# Patient Record
Sex: FEMALE | Race: WHITE
[De-identification: names, ages, dates, MRNs, and addresses within clinical notes are randomized per-mention and may not be internally consistent; named-entity substitution may affect disease eponyms.]

---

## 2021-06-14 ENCOUNTER — HOSPITAL ENCOUNTER (OUTPATIENT)
Dept: HOSPITAL 41 - JD.SDS | Age: 61
Discharge: HOME | End: 2021-06-14
Attending: ORTHOPAEDIC SURGERY
Payer: COMMERCIAL

## 2021-06-14 DIAGNOSIS — G47.33: ICD-10-CM

## 2021-06-14 DIAGNOSIS — Z88.8: ICD-10-CM

## 2021-06-14 DIAGNOSIS — E03.9: ICD-10-CM

## 2021-06-14 DIAGNOSIS — M17.0: Primary | ICD-10-CM

## 2021-06-14 DIAGNOSIS — Z79.899: ICD-10-CM

## 2021-06-14 DIAGNOSIS — Z90.49: ICD-10-CM

## 2021-06-14 DIAGNOSIS — I10: ICD-10-CM

## 2021-06-14 DIAGNOSIS — Z87.891: ICD-10-CM

## 2021-06-14 DIAGNOSIS — E11.9: ICD-10-CM

## 2021-06-14 PROCEDURE — 97161 PT EVAL LOW COMPLEX 20 MIN: CPT

## 2021-06-14 PROCEDURE — 97110 THERAPEUTIC EXERCISES: CPT

## 2021-06-14 PROCEDURE — C1713 ANCHOR/SCREW BN/BN,TIS/BN: HCPCS

## 2021-06-14 PROCEDURE — 27447 TOTAL KNEE ARTHROPLASTY: CPT

## 2021-06-14 PROCEDURE — 97535 SELF CARE MNGMENT TRAINING: CPT

## 2021-06-14 PROCEDURE — 20610 DRAIN/INJ JOINT/BURSA W/O US: CPT

## 2021-06-14 PROCEDURE — 97116 GAIT TRAINING THERAPY: CPT

## 2021-06-14 PROCEDURE — 73560 X-RAY EXAM OF KNEE 1 OR 2: CPT

## 2021-06-14 PROCEDURE — C1776 JOINT DEVICE (IMPLANTABLE): HCPCS

## 2021-06-14 PROCEDURE — 97165 OT EVAL LOW COMPLEX 30 MIN: CPT

## 2021-06-14 RX ADMIN — TRIAMCINOLONE ACETONIDE ONE MG: 40 INJECTION, SUSPENSION INTRA-ARTICULAR; INTRAMUSCULAR at 10:29

## 2021-06-14 RX ADMIN — DEXTROSE ONE GM: 5 SOLUTION INTRAVENOUS at 11:19

## 2021-06-14 RX ADMIN — CEFUROXIME PRN MG: 750 INJECTION, POWDER, FOR SOLUTION INTRAMUSCULAR; INTRAVENOUS at 10:28

## 2021-06-14 RX ADMIN — TRIAMCINOLONE ACETONIDE ONE MG: 40 INJECTION, SUSPENSION INTRA-ARTICULAR; INTRAMUSCULAR at 11:40

## 2021-06-14 RX ADMIN — DEXTROSE ONE GM: 5 SOLUTION INTRAVENOUS at 10:27

## 2021-06-14 RX ADMIN — CEFUROXIME PRN MG: 750 INJECTION, POWDER, FOR SOLUTION INTRAMUSCULAR; INTRAVENOUS at 11:16

## 2021-06-14 NOTE — [id number]
Post Anesthesia Note





- EVALUATION WITHIN 48HRS OF ANESTHETIC


Vital Signs in Normal Range: Yes


Patient Participated in Evaluation: Yes


Respiratory Function Stable: Yes


Airway Patent: Yes


Cardiovascular Function Stable: Yes


Hydration Status Stable: Yes


Pain Control Satisfactory: Yes


Nausea and Vomiting Control Satisfactory: Yes


Mental Status Recovered: Yes


Vital Signs: 


                                Last Vital Signs











Temp  97.9 F   06/14/21 12:15


 


Pulse  65   06/14/21 12:15


 


Resp  12   06/14/21 12:15


 


BP  116/65   06/14/21 12:15


 


Pulse Ox  99   06/14/21 12:15

## 2021-06-14 NOTE — PCM.PREANE
Preanesthetic Assessment





- Procedure


Proposed Procedure: 





Right Total Knee Arthroplasty with Left Knee steroid Injection.





- Anesthesia/Transfusion/Family Hx


Anesthesia History: Prior Anesthesia Without Reaction


Family History of Anesthesia Reaction: No


Transfusion History: No Prior Transfusion(s)


Intubation History: Unknown





- Review of Systems


General: No Symptoms


Pulmonary: No Symptoms (History of LAUREN with failed CPAP: patient denies LAUREN/ 

Former smoker quit 1985, ETOH: rarely)


Cardiovascular: No Symptoms (HTN)


Gastrointestinal: No Symptoms (GERD-tums, Colitis, History of gastric bypass)


Neurological: No Symptoms


Other: Reports: Diabetes (History of prediabetes prior to weight loss.), Thyroid

Problems (History of hypothyroidism), Depression





- Physical Assessment


NPO Status Date: 06/13/21


NPO Status Time: 23:58


Vital Signs: 





HR: 66


B/P: 140/82


Sat: 95%


Resp: 18


Temp: 98


Height: 1.57 m


Weight: 100 kg


ASA Class: 2


Mental Status: Alert & Oriented x3


Airway Class: Mallampati = 2


Dentition: Reports: Normal Dentition, Caries


Thyro-Mental Finger Breadths: 3


Mouth Opening Finger Breadths: 3


ROM/Head Extension: Full


Lungs: Clear to Auscultation, Normal Respiratory Effort


Cardiovascular: Regular Rate, Regular Rhythm, No Murmurs





- Lab


Values: 





All labs reviewed and noted and within acceptable ranges to proceed with 

scheduled procedure.





- Imaging/EKG


Impressions: 





EKG: NSR rate=75, LAD, ? LVH





- Allergies


Allergies/Adverse Reactions: 


                                    Allergies











Allergy/AdvReac Type Severity Reaction Status Date / Time


 


lisinopril AdvReac  Cough Verified 06/11/21 11:23














- Anesthesia Plan


Pre-Op Medication Ordered: Other (Preoperative Meds: (lyrica, tylenol, 

oxycontin) all p.o. at 0816)





- Acknowledgements


Anesthesia Type Planned: Spinal (and a Right Adductor Canal Block under US 

guidance for post operative pain control requested by Dr. Edwards.)


Pt an Appropriate Candidate for the Planned Anesthesia: Yes


Alternatives and Risks of Anesthesia Discussed w Pt/Guardian: Yes


Pt/Guardian Understands and Agrees with Anesthesia Plan: Yes





PreAnesthesia Questionnaire


HEENT History: Reports: Impaired Vision, Other (See Below)


Other HEENT History: wears glasses, contact


Cardiovascular History: Reports: Hypertension


Respiratory History: Reports: None


Gastrointestinal History: Reports: GERD, Other (See Below)


Other Gastrointestinal History: ulcerative colitis


Genitourinary History: Reports: None


OB/GYN History: Reports: None


Musculoskeletal History: Reports: Arthritis


Neurological History: Reports: None


Psychiatric History: Reports: Depression


Endocrine/Metabolic History: Reports: Hypothyroidism


Hematologic History: Reports: None


Immunologic History: Reports: None


Oncologic (Cancer) History: Reports: None


Dermatologic History: Reports: None





- Infectious Disease History


Infectious Disease History: Reports: None





- Past Surgical History


Head Surgeries/Procedures: Reports: None


Cardiovascular Surgical History: Reports: None


Respiratory Surgical History: Reports: None


GI Surgical History: Reports: Appendectomy, Bariatric Procedure, Colonoscopy


Female  Surgical History: Reports: Hysterectomy


Male  Surgical History: Reports: None


Endocrine Surgical History: Reports: None


Neurological Surgical History: Reports: None


Musculoskeletal Surgical History: Reports: None


Oncologic Surgical History: Reports: None


Dermatological Surgical History: Reports: None





- SUBSTANCE USE


Tobacco Use Status *Q: Former Tobacco User


Recreational Drug Use History: No





- HOME MEDS


Home Medications: 


                                    Home Meds





Cholecalciferol (Vitamin D3) [Vitamin D3] 10,000 unit PO DAILY 06/11/21 

[History]


Diclofenac Sodium [Voltaren 1% Gel] 1 dose TOP BID PRN 06/11/21 [History]


Levothyroxine 25 mcg PO DAILY 06/11/21 [History]


Losartan/Hydrochlorothiazide [Losartan-HCTZ 50-12.5 MG] 1 tab PO DAILY 06/11/21 

[History]


Lysine 500 mg PO DAILY 06/11/21 [History]


Magnesium 200 mg PO DAILY 06/11/21 [History]


Multivitamin 1 tab PO DAILY 06/11/21 [History]


estradioL [Estradiol] 0.5 mg PO DAILY 06/11/21 [History]


hydroCHLOROthiazide [Hydrochlorothiazide] 12.5 mg PO DAILY 06/11/21 [History]


sulfaSALAzine [Azulfidine] 1,000 mg PO DAILY 06/11/21 [History]


Cyclobenzaprine [Flexeril] 10 mg PO BID PRN #20 tab 06/14/21 [Rx]


Rivaroxaban [Xarelto] 10 mg PO DAILY #30 tab 06/14/21 [Rx]


oxyCODONE 5 - 10 mg PO Q4H PRN #40 tab 06/14/21 [Rx]











- CURRENT (IN HOUSE) MEDS


Current Meds: 





                               Current Medications





Acetaminophen (Acetaminophen 325 Mg Tab)  975 mg PO ONETIME ASHLYN


   Stop: 06/14/21 16:00


Morphine Sulfate 8 mg/Epinephrine HCl 0.3 mg/Cefuroxime Sodium 750 mg/Ketorolac 

Tromethamine 30 mg/Sodium Chloride 7.9 ml  0 mg .XX ASDIRECTED PRN


   PRN Reason: Pain


   Stop: 06/14/21 16:00


Lactated Ringer's (Ringers, Lactated)  1,000 mls @ 125 mls/hr IV ASDIRECTED ASHLYN


   Stop: 06/14/21 23:00


Lidocaine/Sodium Bicarbonate (Lidocaine 1%/Sod Bicarbonate In Ns 8.4% 1 Ml 

Syringe)  0.25 ml IDERM ONETIME PRN


   PRN Reason: Prior to IV Start


   Stop: 06/14/21 18:00


Oxycodone HCl (Oxycodone Er 10 Mg Tab.Er)  10 mg PO ONETIME ASHLYN


   Stop: 06/14/21 16:00


Pregabalin (Pregabalin 25 Mg Cap)  50 mg PO ONETIME ASHLYN


   Stop: 06/14/21 16:00


Sodium Chloride (Sodium Chloride 0.9% 10 Ml Syringe)  10 ml FLUSH ASDIRECTED PRN


   PRN Reason: Keep Vein Open


   Stop: 06/14/21 18:00





Discontinued Medications





Cefazolin Sodium (Cefazolin 1 Gm Vial) Confirm Administered Dose 2 gm .ROUTE 

.STK-MED ONE


   Stop: 06/14/21 07:53


Epinephrine HCl (Epinephrine 1 Mg/Ml Sdv) Confirm Administered Dose 1 mg .ROUTE 

.STK-MED ONE


   Stop: 06/14/21 07:47


Midazolam HCl (Midazolam 1 Mg/Ml 2 Ml Sdv) Confirm Administered Dose 2 mg .ROUTE

 .STK-MED ONE


   Stop: 06/14/21 07:43


Propofol (Propofol 200 Mg/20 Ml Sdv) Confirm Administered Dose 400 mg .ROUTE 

.STK-MED ONE


   Stop: 06/14/21 07:43


Ropivacaine (Ropivacaine 0.5% 5 Mg/Ml 30 Ml Sdv) Confirm Administered Dose 30 ml

 .ROUTE .STK-MED ONE


   Stop: 06/14/21 07:47

## 2021-06-14 NOTE — PCM.POSTAN
POST ANESTHESIA ASSESSMENT





- MENTAL STATUS


Mental Status: Alert, Oriented





- VITAL SIGNS


Vital Signs: 


                                Last Vital Signs











Temp  208.2 F H  06/14/21 12:00


 


Pulse  66   06/14/21 12:00


 


Resp  18   06/14/21 12:00


 


BP  107/67   06/14/21 12:00


 


Pulse Ox  100   06/14/21 12:00








1143 in PACU 


BP: 115/56


HR 85


RR 15


Temp: 97.4


Sat 96 2 liters





- RESPIRATORY


Respiratory Status: Respiratory Rate WNL, Airway Patent, O2 Saturation Stable





- CARDIOVASCULAR


CV Status: Pulse Rate WNL, Blood Pressure Stable





- GASTROINTESTINAL


GI Status: No Symptoms





- PAIN


Pain Score: 7


Free Text/Narrative:: 





Adductor canal block completed following arrival to PACU





- POST OP HYDRATION


Hydration Status: Adequate & Stable

## 2021-06-14 NOTE — CR
Right knee: AP and crosstable lateral views of the right knee were 

obtained.

 

Comparison: Prior right knee CT study of 06/01/21.

 

Knee prosthesis is seen.  Components are aligned.  Patellar prosthesis

 is also noted.  Soft tissue air is noted.  Underlying bony structures

 show nothing acute.

 

Impression:

1.  Satisfactory postop radiographic appearance of recently placed 

right knee prostheses.

 

Diagnostic code #2

## 2021-06-14 NOTE — PCM.PRNOTE
- Free Text/Narrative


Note: 





Right selective femoral nerve block at the adductor canal for post-procedure 

pain control under US guidance requested by Dr. Edwards.


Date: 6/14/21


Time Out: 1150


Start: 1151


End: 1200





Chart reviewed.  Consent signed.  Questions answered. Appropriate monitors 

applied.  Time out performed. Large amount of adipose tissue noted, depth 

increased to allow for visualization of anatomy. Right mid-shaft femur 

identified with ultrasound, scanning medially of femur, the femoral artery in 

the adductor canal visualized, and the femoral nerve located laterally to the 

artery. The skin was prepped lateral to the ultrasound probe with chlorahexadine

times two.  The 21ga 4 insulated block needle was inserted under direct 

ultrasound guidance into the adductor canal.  25mL of 0.5% ropivacaine with 

1:200,000 epinephrine was injected circumferentially around the nerve with 

intermittent negative aspiration noted.  Patient tolerated the procedure well.  

Sterile technique noted along with sterile gloves, mask, and sterile probe 

cover.  See picture on progress note and vital signs on nurses notes.  Block 

completed in PACU.  





Renetta Arevalo, CRNA

## 2021-06-29 NOTE — OR
DATE OF OPERATION:  06/14/2021

 

SURGEON:  Mervin Edwards MD

 

OPERATION PERFORMED:  Right total knee arthroplasty with [employer] robot[employer]

and left knee corticosteroid injection.

 

PREOPERATIVE DIAGNOSIS:

Bilateral knee osteoarthrosis.

 

POSTOPERATIVE DIAGNOSIS:

Bilateral knee osteoarthrosis.

 

ANESTHESIA:

Local MAC with spinal.

 

ANESTHESIA PROVIDER:

Clinton Torres.

 

ASSISTANTS:

Mamie Robin PA-C and Florencia Washington LPN.

 

ESTIMATED BLOOD LOSS:

200 mL.

 

COMPLICATIONS:

None.

 

CONDITION:

Stable.

 

IMPLANT:

1. [employer] size 3 press-fit CR femur.

2. [employer] size 3 press-fit tibial baseplate.

3. [employer] size 3 13 mm CS polyethylene insert.

4. Rohith size 29 x 9 mm press-fit asymmetric patella.

 

DESCRIPTION OF PROCEDURE:

The patient was identified in the preoperative holding area where proper site

was marked and identified by the surgeon.  The patient was taken back to the

operative theater where after adequate anesthesia, the patient's right lower

extremity had a nonsterile tourniquet applied and then sterilely prepped and

draped in the usual sterile fashion.  OR time-out was performed.  The patient

received 2 g IV Ancef.  Right lower extremity had the leg hannah boot applied

and then was exsanguinated.  Tourniquet was insufflated to 250 mmHg.  Standard

anterior incision was made and medial parapatellar arthrotomy was created.  Deep

fibers of the MCL were raised and anterior fat pad was resected.  Attention was

turned to the patella.  Patella measured 21, resected to a 13 for 29 x 9 mm

patella.  Drill holes were then drilled and found to have adequate bone.  At

this time, two 4.0 Schanz pins were placed intra-incisionally on the femur and

the [employer] robotic array for the femur was placed.  Two more small focal

incisions were placed on the tibia 3 fingerbreadths below the tibial tubercle

and the Rohith Leon robotic array was placed.  Checkpoints were placed on the

femur and the tibia.  Hip center rotation was obtained.  Medial and lateral

malleoli were marked.  Both checkpoints were then marked.  40 points were then

obtained on both the femur and the tibia for this [employer] robotic plan.

The patient's knee then was brought to full extension.  The patient was noted to

have greater than 15 degrees of recurvatum.  I then did varus-valgus instability

at 0 degrees and at 90 degrees of flexion.  At this time, we did 20 mm flexion

and extension gaps with the patient.  At this time, the [employer] robotic saw

blade was brought in.  The tibial cut was completed as well as posterior femoral

cut, anterior femoral cut, and anterior chamfer cut.  Saw blade was then

switched and the distal femoral cut and posterior chamfer cuts were completed.

It was found to have adequate bony resection as well as adequate bone stock.  At

this time, bony fragments were removed as well as the medial and lateral

meniscus were resected and posterior osteophytes were removed.  The size 3 trial

baseplate was then placed.  Size 3 trial femur was placed and a 9 mm trial poly

was placed.  The patient was noted to have significant extensor lag still.  I

was then able to get up to a size 13 and the patient had just 4 to 5 degrees of

recurvatum which was significantly improved from previous.  At this time, it was

otherwise stable to varus-valgus stresses.  The femur drill holes were drilled.

The tibia was stamped and drilled in the proper rotation.  Trial implants were

then removed.  The tibia was impacted into place.  Size 3 femur was impacted in

place and the 13 mm polyethylene insert was impacted in place.  The patient's

knee was brought to full extension.  A 29 x 9 mm press-fit patella was then

press-fit in place.  At this time, tourniquet was deflated.  Bleeders were

cauterized.  The [employer] robotic checkpoints were removed.  1 L pulse

lavage irrigation with Ancef was irrigated through the knee along with 400 mL

Irrisept irrigation.  Periarticular injection was completed.  Topical tranexamic

acid and vancomycin powder were applied.  A #2 barbed suture was used for

closure of the medial parapatellar arthrotomy.  2-0 Vicryl was used

subcutaneously as well as Stratafix and Prineo was used for skin closure.  The

patient had a sterile soft dressing applied.  Attention was turned to the

contralateral knee and 2 mL of 40 mg

Kenalog and 4 mL of 0.25% Marcaine were injected in the left knee.  The patient

tolerated this well, was sent to PACU in stable condition.

 

DD:  06/29/2021 14:50:55

DT:  06/29/2021 15:12:11  MMODAL

Job #:  [id number]

## 2021-06-29 NOTE — PCM.OPNOTE
- General Post-Op/Procedure Note


Date of Surgery/Procedure: 06/14/21


Operative Procedure(s): right total knee arthroplasty with dima ju[employer] robotics

and left knee corticosteroid injection


Pre Op Diagnosis: bilateral knee osteoarthrosis


Post-Op Diagnosis: Same


Anesthesia Technique: Local, MAC, Spinal


Primary Surgeon: Mervin Edwards


Anesthesia Provider: Renetta Arevalo


Assistant: Mamie Robin


Assistant: Florencia Washington


EBL in mLs: 200


Complications: None


Condition: Good


Free Text/Narrative:: 


3/3


13mm


29x9

## 2021-09-20 ENCOUNTER — HOSPITAL ENCOUNTER (OUTPATIENT)
Dept: HOSPITAL 41 - JD.SDS | Age: 61
Discharge: HOME | End: 2021-09-20
Attending: ORTHOPAEDIC SURGERY
Payer: COMMERCIAL

## 2021-09-20 DIAGNOSIS — Z20.822: ICD-10-CM

## 2021-09-20 DIAGNOSIS — Z98.890: ICD-10-CM

## 2021-09-20 DIAGNOSIS — I10: ICD-10-CM

## 2021-09-20 DIAGNOSIS — Z88.8: ICD-10-CM

## 2021-09-20 DIAGNOSIS — E11.9: ICD-10-CM

## 2021-09-20 DIAGNOSIS — G47.33: ICD-10-CM

## 2021-09-20 DIAGNOSIS — E03.9: ICD-10-CM

## 2021-09-20 DIAGNOSIS — Z79.899: ICD-10-CM

## 2021-09-20 DIAGNOSIS — M17.0: Primary | ICD-10-CM

## 2021-09-20 PROCEDURE — 27447 TOTAL KNEE ARTHROPLASTY: CPT

## 2021-09-20 PROCEDURE — 73560 X-RAY EXAM OF KNEE 1 OR 2: CPT

## 2021-09-20 PROCEDURE — C1776 JOINT DEVICE (IMPLANTABLE): HCPCS

## 2021-09-20 PROCEDURE — C1713 ANCHOR/SCREW BN/BN,TIS/BN: HCPCS

## 2021-09-20 PROCEDURE — 97161 PT EVAL LOW COMPLEX 20 MIN: CPT

## 2021-09-20 PROCEDURE — 97110 THERAPEUTIC EXERCISES: CPT

## 2021-09-20 NOTE — CR
Left knee: AP and crosstable lateral views of the left knee were 

obtained.

 

Comparison: Prior CT left knee study of 09/08/21.

 

Knee prosthesis is seen.  Patellar prosthesis is also noted.  

Underlying bony structures show nothing else acute.  Mild amount of 

soft tissue air is seen.

 

Impression:

1.  Satisfactory postoperative radiographic appearance of recently 

placed left knee prostheses.

 

Diagnostic code #2

## 2021-09-20 NOTE — [id number]
Post Anesthesia Note





- EVALUATION WITHIN 48HRS OF ANESTHETIC


Vital Signs in Normal Range: Yes


Patient Participated in Evaluation: Yes


Respiratory Function Stable: Yes


Airway Patent: Yes


Cardiovascular Function Stable: Yes


Hydration Status Stable: Yes


Pain Control Satisfactory: Yes


Nausea and Vomiting Control Satisfactory: Yes


Mental Status Recovered: Yes


Vital Signs: 


                                Last Vital Signs











Temp  36.2 C   09/20/21 12:45


 


Pulse  76   09/20/21 12:45


 


Resp  14   09/20/21 12:45


 


BP  105/62   09/20/21 12:45


 


Pulse Ox  100   09/20/21 12:45

## 2021-09-20 NOTE — PCM.SN.2
- Free Text/Narrative


Note: 





Left selective femoral nerve block at the adductor canal for post-procedure pain

control under US guidance requested by Dr. Edwards.


Time Out: 1211


Start: 1213


End: 1218





Chart reviewed.  Consent signed.  Questions answered. Appropriate monitors 

applied.  


Time out performed. Left mid-shaft femur identified with ultrasound, scanning 

medially of femur, the femoral artery in the adductor canal visualized, and the 

femoral nerve located laterally to the artery.


 The skin was prepped lateral to the ultrasound probe with chlorahexadine times 

two.  The 21ga 4 insulated block needle was inserted under direct ultrasound 

guidance into the adductor canal.  20mL of 0.5% ropivacaine with 1:200,000 

epinephrine was injected circumferentially around the nerve with intermittent 

negative aspiration noted.  


Patient tolerated the procedure well.  Sterile technique noted along with 

sterile gloves, mask, and sterile probe cover.  See picture on progress note and

vital signs on nurses notes.  Block completed in PACU.  


Shabnam Maher CRNA





Time Documentation

## 2021-09-29 NOTE — PCM.OPNOTE
- General Post-Op/Procedure Note


Date of Surgery/Procedure: 09/20/21


Operative Procedure(s): left total knee arthroplasty with dima juan manuel robotics


Pre Op Diagnosis: left knee osteoarthrosis


Post-Op Diagnosis: Same


Anesthesia Technique: Local, MAC, Spinal


Primary Surgeon: Mervin Edwards


Anesthesia Provider: Katie Moran


Assistant: Mamie Robin


Assistant: Dinah Kay


EBCONOR in mLs: 100


Complications: None


Condition: Good


Free Text/Narrative:: 


3/3


9mm


32x10 press fit

## 2024-09-24 NOTE — OR
Chief Complaint(s):   Chief Complaint   Patient presents with    Office Visit    Neck    Back Pain     low     Date of Injury: 24  Initial Treatment Date: 24  Date Last Seen: 24  Visit Number of Current Episode: 4  Flare Count: 2  Mechanism of Onset: camping  Initial pain ratin  Occupation: Regional    Referred by: Marc Sharma DC  Primary Care Physician: Ophelia Sandoval DO  I have reviewed the past medical history, family history, social history, medications and allergies listed in the medical record as obtained by my nursing staff and support staff and agree with their documentation.  Lu  reports that she has never smoked. She has never been exposed to tobacco smoke. She has never used smokeless tobacco.  Lu has No Known Allergies.  Advance Directive Status: None   Date informed consent expires on: 10/23/25  Date dry needling consent signed: LARRY  Medicare Waiver signed: NA  Discharged from care: No    Treatment Synopsis:    Plan includes: Joint Manipulation and Soft Tissue Mobilization    SUBJECTIVE:    Lu Awad returns for continued treatment.     Symptom location: neck pain, bilateral, low back pain,  right, and jaw pain/TMJ, bilateral  Pain/Symptom since last visit: better  Function since last visit: better  Soreness post treatment: No  New symptoms (i.e. accident/injuries)?: No   Current Pain/Symptom Scale (0-10, 10 being the worst, 0 being no pain): 2  Pain/Symptom at its best since last visit 0-10: 0  Pain/Symptom at its worst since last visit 0-10: 3  Pain/Symptom Description: tight, sore  Increases pain/symptom: N/A, but notices it more in the morning/when she wakes up  Radiates: no   Numbness/Tingling: no  Bowel/Bladder Dysfunction: n/a  Sleep disturbance due to pain/symptom:  \"maybe just a little\" - from the neck  Exercises/stretches: yes  Headache: no - but had one yesterday and has been having them more  Patient reports feeling better since initial visit by  DATE OF OPERATION:  09/20/2021

 

SURGEON:  Mervin Edwards MD

 

OPERATION PERFORMED:  Left total knee arthroplasty using [place] [employer] robotics.

 

PREOPERATIVE DIAGNOSIS:

Left knee osteoarthrosis.

 

POSTOPERATIVE DIAGNOSIS:

Left knee osteoarthrosis.

 

ANESTHESIA:

Local MAC with spinal.

 

ANESTHESIA PROVIDER:

Katie Moran CRNA

 

ASSISTANTS:

Mamie Robin PA-C and Dinah Kay RN

 

ESTIMATED BLOOD LOSS:

100 mL.

 

COMPLICATIONS:

None.

 

CONDITION:

Stable.

 

IMPLANTS:

1. Rohith size 3 press-fit CR femur.

2. [place] size 3 press-fit tibial baseplate.

3. [place] size 3, 9 mm CS polyethylene insert.

4. Rohith size 32 x 10 mm press-fit asymmetric patella.

 

DESCRIPTION OF PROCEDURE:

The patient was identified in the preop holding area.  Proper site was marked

and identified by surgeon.  The patient was taken back to the operating theater,

where after adequate anesthesia, the patient's left lower extremity had a

nonsterile tourniquet applied.  It was then sterilely prepped and draped in the

usual sterile fashion.  OR time-out was performed.  The patient received 2 g IV

Ancef.  Leg hannah was applied in the left lower extremity.  Left lower

extremity was exsanguinated.  Tourniquet was insufflated to 250 mmHg.  Standard

anterior incision was made and medial parapatellar arthrotomy was created.  Deep

fibers of the MCL were raised and anterior fat pad was resected.  Attention was

turned to the patella.  Patella measured 24, resected to 14 for a 32 x 10 mm

patella.  Drill holes were then drilled.  Two 4.0 Schanz pins then were placed

intra-incisionally on the femur for the [place] [employer] robotic array and 2 more

were placed in the tibia extra incisional length 3 fingerbreadths below the

tibial tubercle.  Checkpoints were applied to the femur and the tibia.  Hip

center rotation was obtained.  Medial and lateral malleoli were marked.

Checkpoints were marked.  40 points were then obtained of both the femur and the

tibia for the [place] [employer] robotic plan.  The patient's knee was brought to full

extension as well as 90 degrees of flexion, and varus and valgus stresses were

applied.  R[employer] Leon robotic plan for this patient was then undertaken.  A

straight saw blade was brought in.  Tibia cut was completed.  The anterior

femoral cut, anterior chamfer cut, and posterior femoral cuts were completed.

Saw blade was switched.  Distal femoral cut and posterior chamfer cut were then

completed.  All bony fragments were removed.  Medial and lateral menisci were

resected as well as any posterior osteophytes.  At this time, trial implants

size 3 and size 3 were placed.  9 mm trial was placed.  The patient had full

extension and flexion.  No signs of liftoff and no varus-valgus instability.

Femoral drill holes were drilled.  Tibia was stamped and drilled in proper

rotation.  Size 3 press-fit base plate was then impacted in place.  Size 3 femur

was impacted in place.  A 9 mm CS polyethylene insert was then impacted in

place.  32 x 10 mm press-fit patella was pressed into place.  Tourniquet was

deflated.  Bleeders were cauterized.  1 L pulse lavage irrigation with Ancef was

irrigated through the knee along with 400 mL Irrisept irrigation.  Topical

tranexamic acid and vancomycin powder were applied.  Periarticular injection was

completed.  #2 barbed suture was used for closure of the medial parapatellar

arthrotomy.  Checkpoints and arrays and pins were removed before this.  2-0

Vicryl and Stratafix were used for subcutaneous closure, and Prineo was used for

skin closure.  The patient tolerated the procedure well, sent to PACU in stable

condition.

 

DD:  10/04/2021 07:27:45

DT:  10/04/2021 07:59:30  MAYE

Job #:  [id number] 50%     Additional information: NA    VITALS    Visit Vitals  /74 (BP Location: RUE - Right upper extremity, Patient Position: Sitting, Cuff Size: Regular)   Pulse 96   Temp 97.1 °F (36.2 °C)   LMP 04/21/2024 (Approximate)   SpO2 97%     ALLERGIES    ALLERGIES:  No Known Allergies    CURRENT MEDICATIONS    Current Outpatient Medications   Medication Sig Dispense Refill    Cholecalciferol (VITAMIN D3) 5000 units capsule Take 1 capsule by mouth daily. Do not start before October 7, 2019. 90 capsule 3     No current facility-administered medications for this visit.     PAST MEDICAL HISTORY    Past Medical History:   Diagnosis Date    AMA (advanced maternal age) multigravida 35+ (CMD)     Ankle sprain 2017    left    Hallux abductovalgus with bunions      SURGICAL HISTORY    Past Surgical History:   Procedure Laterality Date    Episiotomy/vaginal repr by othr than attending      x3    Vagina surgery      Vaginal Tags Removed; after 1st delivery    [place] tooth extraction       SOCIAL HISTORY    Social History     Tobacco Use    Smoking status: Never     Passive exposure: Never    Smokeless tobacco: Never   Vaping Use    Vaping status: never used   Substance Use Topics    Alcohol use: No    Drug use: No     FAMILY HISTORY    Family History   Problem Relation Age of Onset    Depression Mother     Heart Mother         Stents    Stroke/TIA Mother         small vessel disease    Blood Disorder Mother         Factor 5 Leiden    Hypertension Mother     Cancer, Prostate Father     Heart disease Father     Diabetes Brother         Sibiling    Patient is unaware of any medical problems Son     Patient is unaware of any medical problems Son     Heart disease Maternal Grandmother     Diabetes Maternal Grandmother     Diabetes Paternal Grandmother     Hypertension Paternal Grandmother     Cancer, Colon Maternal Grandfather     Depression Paternal Grandfather     Diabetes Paternal Grandfather        Patient Emotional screening  was completed 8/7/24; [employer] Pain Supplemental Questionnaire score was na out of 40.    OBJECTIVE:      GENERAL:  Denies fever, chills, tiredness, malaise, unintentional weight changes.  HEENT: Denies ear pain, eye drainage, rhinorrhea, sore throat, speech issues or swallowing problems.  CARDIOVASCULAR:  Denies chest pain, shortness of breath.  RESPIRATORY: Denies wheezing, frequent cough, shortness of breath.  GASTROINTESTINAL: Denies issues with bowel movements, abdominal pain, or issues with food consumption.  PSYCHIATRIC: Oriented in time, place, and person.  Denies problems with bowel or bladder incontinence.    Motion units are composed of 2 bones and are identified as one joint/region except for C7-T1, T12-L1, or L5-S1 which are motion units that are composed of 2 regions.    Cervical Evaluation:    Cervical compression: Negative  Cervical Distraction: Negative    Point tenderness noted in Suboccipitals, Bilateral muscles. Trigger points noted.     Cervical Facet Joint function is within normal limits except for her Occiput/C1, Right and Occiput/C1, Left facet joints that exhibited limited passive range of motion and segmental restriction with tenderness upon palpation. The following muscles were examined for normal flexibility and tone; right and left upper trapezius muscle, right and left scalene muscle, right and left levator scapulae muscle, deep neck flexor muscle, right and left sternocleidomastoid muscle (SCM), right and left suboccipital muscle, which were found to be within normal limits except for her Suboccipital, Bilateral that exhibited limited flexibility and were hypertonic at rest. Skin inspection in this area appeared to be free of lesions, rashes, or ulcers. Lymph nodes appeared to be not swollen and non tender in this area.    Jaw Evaluation:    TMJ joint function is within normal limits except for her Temporomandibular joint that exhibited limited passive range of motion and segmental  restriction and tenderness upon palpation. The muscles associated with this area were examined for normal flexibility and tone. All were within normal limits except for her  Masseter,Bilateral that exhibited limited flexibility and were hypertonic at rest. Skin inspection in this area appeared to be free of lesions, rashes, or ulcers. Lymph nodes appeared to be not swollen and nontender in this area.    Thoracic Evaluation:    Hess's: Negative    Thoracic spine facet joint function is within normal limits except for her T4/T5, Right, T4/T5, Left, T5/T6, Right, and T5/T6, Left facet joints that exhibited limited passive range of motion and segmental restriction and tenderness upon palpation; The following muscles were examined for normal flexibility and tone; right and left rhomboid muscle; right and left serratus muscle; left and right latissimus dorsi muscle; These muscles were within normal limits except for her Thoracic Erector Spinae, Bilateral that exhibited limited flexibility and abnormal resting tone. Skin inspection in this area appeared to be free of lesions, rashes, or ulcers. Lymph nodes appeared to be not swollen and non tender in this area.    Lumbar, Sacral and Pelvic Evaluation:    Sheer: Positive Right    Pelvic and Sacral spine facet joint function is within normal limits except for her L5/S1, Right, L5/S1, Left, Sacroiliac, Right, and Sacroiliac, Left facet joints that exhibited limited passive range of motion and segmental restriction and tenderness on palpation. The following muscles were examined for flexibility and tone at rest; right and left hip flexor muscle; right and left hip adductor muscle; right and left hip rotator muscle; right and left piriformis muscle; right and left hamstring muscle; right and left Gluteus medius muscle and gluteus corey muscle; right and left quadratus lumborum muscle; left and right Tensor fasciae latae muscle; left and right internal hip rotators muscle;  right and left quadriceps muscle.  These muscles were found to be within normal limits except for her Dorsal Sacral Ligament, Bilateral, Gluteus Dmitri, Right, and Gluteus Medius, Right  that exhibited limited flexibility and were hypertonic at rest. Skin inspection in this area appeared to be free of lesions, rashes, or ulcers. Lymph nodes appeared to be not swollen and non tender in this area.      ASSESSMENT:   1. Cervicalgia    2. Pain in thoracic spine    3. Chronic bilateral low back pain without sciatica    4. Segmental and somatic dysfunction of cervical region    5. Segmental and somatic dysfunction of thoracic region    6. Segmental and somatic dysfunction of lumbar region    7. Segmental and somatic dysfunction of sacral region    8. Trigger point    9. Segmental and somatic dysfunction of pelvic region          Complicating Factors/Co-morbidities:   See Hx    PLAN:    Functional Goal/Measure/Comments: SittingNeel Awad was advised to follow up in 1-1.5 weeks or PRN for continued treatment. She reports feeling good post treatment. Discussed getting a cervical MRI, however, going to hold off for now.     Manipulation/Mobilization: Patient was evaluated and treated the musculature noted as taut in objective findings of this progress report to improve flexibility and decrease strain to spinal structures. Treatment time was for 6 Minute.    Cervical: LOW VELOCITY LOW AMPLITUDE TECHNIQUE  Thoracic: DIVERSIFIED TECHNIQUE  Lumbar: DIVERSIFIED TECHNIQUE  Sacral: DIVERSIFIED TECHNIQUE  Pelvic: DIVERSIFIED TECHNIQUE  Upper Extremity: N/A  Lower Extremity: N/A     Soft Tissue Treatment: Soft tissue treatment was performed over the previously documented musculature restricted and tender to palpation for 6 Minute for the purpose of improving joint mobility, alignment, tissue repair, lymphatic drainage, pain relief, and myofascial adhesions.    Myofascial Release  PROM performed today by Marc Sharma DC  Suboccipitals, Bilateral, Gluteus corey muscle, Right, Gluteus medius muscle, Right, and Masseter, Bilateral      Exercise:    Active Exercises/Stretches: Patient instructed on Figure 4 Stretch  and Suboccipital Release using rolled towel under neck while lying in bed to tolerance. Patient demonstrated exercises and stretches without fail. Patient instructed to perform exercises 3 x 10 daily as tolerated. Time of instruction is less than 8 minutes.     Inactive or Previous Exercises/Stretches Given: NA    Patient informed that increased muscle soreness after chiropractic treatment is normal and usually lasts 24-48 hours. Patient instructed to call if any questions or concerns arise.     Other treatment options discussed with patient massage therapy, physical therapy, dry needling, physiatry, and trigger point injections.    Patient is to return in 4-7 days for continued care and treatment of her condition consistent with plan of care.    Total time required for this encounter: 20 minutes include evaluation of medical records, review of recent new imaging, face-to-face contact with the patient, medical decision making, and documentation.     I personally reviewed and discussed with the patient the previous Cervical X-ray and my findings are consistent with the previous report.      Goals of Care: Goal of care is to improve muscular and skeletal function and provide symptom relief as well as increased function. Care is initially planned for 1-3 times/week for 2-3 weeks and the duration of this plan is contingent upon patient response.    INFORMED CONSENT FOR JOINT MANIPULATION    The nature of the chiropractic adjustment (manipulation):  A Doctor of Chiropractic may use his/her hands or manipulation under traction in such a way as to move one or more joints. That may cause an audible \"pop\" or \"click,\" much as one might experience when they \"crack\" a knuckle. A sense of movement may be felt.  The material risks  inherent in chiropractic adjustment (manipulation):  As with any health care procedure, there are certain complications that may arise during a chiropractic adjustment. Those complications may include but are not limited to, fracture, disc injury, dislocation, strain/sprain, vascular accident, or stroke. Some patients will feel some stiffness or soreness after the first few days of treatment.  The probability of those risks occurring:  Vascular accidents or stroke with manipulation or adjustments are rare occurrences. Studies have shown approximately one in 1.4 million chance of occurrence with manipulation of the neck. We use tests in our examinations that are designed to identify if you have increased susceptibility to that kind of injury. The other complications are also generally described as \"rare.\"  Other possible chiropractic treatment options include:  Electrical muscle stimulation, T.E.N.S., ultrasound therapy, stretching/exercises, hot or cold packs, massage, nutritional or other lifestyle modifications; The risks and benefits of Electrical muscle stimulation, Ultrasound Therapy, Myofascial release, and Exercises.    Other possible treatment options for your condition include: Dry Needling.  Self-administered, over-the-counter analgesics, and /or rest.  Medical care with prescription drugs such as anti-inflammatories, muscle relaxants, and/or painkillers.  Surgery.  The above-listed options have their own inherent risks, which should be discussed with the treating provider if one of these options is selected.  The risks of remaining untreated:  Remaining untreated allows the formation of adhesions and reduces mobility, which may set up a pain reaction further reducing mobility. Over time this may complicate care, making chiropractic treatment more difficult and less effective.  Patient Instruction/Education:   Patient educated in the nature of the condition and likely pain generators as well as a plan of  care to resolve symptoms and improve muscular and skeletal function.  The patient noted verbal understanding of condition and is agreeable to plan of care. Patient stated understanding of and was in agreement with, the discussed instructions. All questions and comments were answered and addressed during the visit. The patient tolerated and responded well to visit. Verbal consent was obtained prior to any manual medicine treatments/procedures as well as but not excluding manipulation. Patient understands and agrees to the procedure.